# Patient Record
Sex: MALE | Race: WHITE | NOT HISPANIC OR LATINO | Employment: OTHER | URBAN - NONMETROPOLITAN AREA
[De-identification: names, ages, dates, MRNs, and addresses within clinical notes are randomized per-mention and may not be internally consistent; named-entity substitution may affect disease eponyms.]

---

## 2024-09-17 ENCOUNTER — OFFICE VISIT (OUTPATIENT)
Dept: FAMILY MEDICINE CLINIC | Age: 49
End: 2024-09-17
Payer: COMMERCIAL

## 2024-09-17 VITALS
DIASTOLIC BLOOD PRESSURE: 84 MMHG | HEIGHT: 72 IN | BODY MASS INDEX: 27.74 KG/M2 | WEIGHT: 204.8 LBS | SYSTOLIC BLOOD PRESSURE: 106 MMHG | HEART RATE: 82 BPM | TEMPERATURE: 98.4 F

## 2024-09-17 DIAGNOSIS — Z00.00 ANNUAL PHYSICAL EXAM: Primary | ICD-10-CM

## 2024-09-17 DIAGNOSIS — Z13.220 SCREENING, LIPID: ICD-10-CM

## 2024-09-17 DIAGNOSIS — Z11.59 NEED FOR HEPATITIS C SCREENING TEST: ICD-10-CM

## 2024-09-17 DIAGNOSIS — Z13.1 SCREENING FOR DIABETES MELLITUS: ICD-10-CM

## 2024-09-17 DIAGNOSIS — Z12.5 SCREENING FOR PROSTATE CANCER: ICD-10-CM

## 2024-09-17 DIAGNOSIS — R63.5 WEIGHT GAIN: ICD-10-CM

## 2024-09-17 PROBLEM — I83.90 VARICOSE VEINS OF LOWER EXTREMITY: Status: ACTIVE | Noted: 2017-06-19

## 2024-09-17 PROCEDURE — 99386 PREV VISIT NEW AGE 40-64: CPT | Performed by: NURSE PRACTITIONER

## 2024-10-22 ENCOUNTER — TELEPHONE (OUTPATIENT)
Dept: FAMILY MEDICINE CLINIC | Age: 49
End: 2024-10-22
Payer: COMMERCIAL

## 2025-06-19 ENCOUNTER — TELEPHONE (OUTPATIENT)
Dept: FAMILY MEDICINE CLINIC | Age: 50
End: 2025-06-19
Payer: COMMERCIAL

## 2025-06-19 NOTE — TELEPHONE ENCOUNTER
Laina Meléndez, Alejandra Hartley LPN  Got ER report , call and see if he is feeling better, if not he may need to be seen again

## 2025-06-20 NOTE — TELEPHONE ENCOUNTER
Spoke with pt who appreciated the call to check on him, he did go back to ED and they think it could be an ulcer vs a tick bite borne illness. They recommended he see gastro. His insurance does not require a referral. Offered pt appt 6/26/25, he declined for now. He was given the number to [facility] to schedule

## 2025-07-10 ENCOUNTER — LAB (OUTPATIENT)
Dept: LAB | Facility: HOSPITAL | Age: 50
End: 2025-07-10
Payer: COMMERCIAL

## 2025-07-10 ENCOUNTER — OFFICE VISIT (OUTPATIENT)
Age: 50
End: 2025-07-10
Payer: COMMERCIAL

## 2025-07-10 VITALS
WEIGHT: 200.8 LBS | OXYGEN SATURATION: 98 % | SYSTOLIC BLOOD PRESSURE: 142 MMHG | BODY MASS INDEX: 27.2 KG/M2 | HEIGHT: 72 IN | DIASTOLIC BLOOD PRESSURE: 99 MMHG | HEART RATE: 65 BPM

## 2025-07-10 DIAGNOSIS — Z80.0 FAMILY HISTORY OF COLON CANCER IN FATHER: ICD-10-CM

## 2025-07-10 DIAGNOSIS — K76.0 HEPATIC STEATOSIS: ICD-10-CM

## 2025-07-10 DIAGNOSIS — K76.0 HEPATIC STEATOSIS: Primary | ICD-10-CM

## 2025-07-10 LAB
ALBUMIN SERPL-MCNC: 4.6 G/DL (ref 3.5–5.2)
ALP SERPL-CCNC: 88 U/L (ref 39–117)
ALPHA-FETOPROTEIN: 3.81 NG/ML (ref 0–8.3)
ALT SERPL W P-5'-P-CCNC: 37 U/L (ref 1–41)
AST SERPL-CCNC: 35 U/L (ref 1–40)
BILIRUB CONJ SERPL-MCNC: 0.2 MG/DL (ref 0–0.3)
BILIRUB INDIRECT SERPL-MCNC: 0.4 MG/DL
BILIRUB SERPL-MCNC: 0.6 MG/DL (ref 0–1.2)
CERULOPLASMIN SERPL-MCNC: 19 MG/DL (ref 16–31)
FERRITIN SERPL-MCNC: 161 NG/ML (ref 30–400)
HAV IGM SERPL QL IA: NORMAL
HBV CORE IGM SERPL QL IA: NORMAL
HBV SURFACE AG SERPL QL IA: NORMAL
HCV AB SER QL: NORMAL
INR PPP: 0.94 (ref 0.86–1.15)
IRON 24H UR-MRATE: 111 MCG/DL (ref 59–158)
IRON SATN MFR SERPL: 34 % (ref 20–50)
PROT SERPL-MCNC: 7.1 G/DL (ref 6–8.5)
PROTHROMBIN TIME: 12.9 SECONDS (ref 11.8–14.9)
TIBC SERPL-MCNC: 331 MCG/DL (ref 298–536)
TRANSFERRIN SERPL-MCNC: 222 MG/DL (ref 200–360)

## 2025-07-10 PROCEDURE — 84466 ASSAY OF TRANSFERRIN: CPT

## 2025-07-10 PROCEDURE — 84155 ASSAY OF PROTEIN SERUM: CPT

## 2025-07-10 PROCEDURE — 36415 COLL VENOUS BLD VENIPUNCTURE: CPT

## 2025-07-10 PROCEDURE — 82784 ASSAY IGA/IGD/IGG/IGM EACH: CPT

## 2025-07-10 PROCEDURE — 83540 ASSAY OF IRON: CPT

## 2025-07-10 PROCEDURE — 82525 ASSAY OF COPPER: CPT

## 2025-07-10 PROCEDURE — 82104 ALPHA-1-ANTITRYPSIN PHENO: CPT

## 2025-07-10 PROCEDURE — 82728 ASSAY OF FERRITIN: CPT

## 2025-07-10 PROCEDURE — 84165 PROTEIN E-PHORESIS SERUM: CPT

## 2025-07-10 PROCEDURE — 86015 ACTIN ANTIBODY EACH: CPT

## 2025-07-10 PROCEDURE — 82390 ASSAY OF CERULOPLASMIN: CPT

## 2025-07-10 PROCEDURE — 82103 ALPHA-1-ANTITRYPSIN TOTAL: CPT

## 2025-07-10 PROCEDURE — 80076 HEPATIC FUNCTION PANEL: CPT

## 2025-07-10 PROCEDURE — 82105 ALPHA-FETOPROTEIN SERUM: CPT

## 2025-07-10 PROCEDURE — 86381 MITOCHONDRIAL ANTIBODY EACH: CPT

## 2025-07-10 PROCEDURE — 86334 IMMUNOFIX E-PHORESIS SERUM: CPT

## 2025-07-10 PROCEDURE — 99214 OFFICE O/P EST MOD 30 MIN: CPT

## 2025-07-10 PROCEDURE — 86038 ANTINUCLEAR ANTIBODIES: CPT

## 2025-07-10 PROCEDURE — 85610 PROTHROMBIN TIME: CPT

## 2025-07-10 PROCEDURE — 80074 ACUTE HEPATITIS PANEL: CPT

## 2025-07-10 RX ORDER — DICYCLOMINE HCL 20 MG
20 TABLET ORAL
COMMUNITY
Start: 2025-06-19

## 2025-07-10 RX ORDER — DOXYCYCLINE 100 MG/1
CAPSULE ORAL
COMMUNITY
Start: 2025-06-19

## 2025-07-10 NOTE — PROGRESS NOTES
Chief Complaint     Follow-up (ED f/u generalized ABD pain, nausea, pt denies vomiting/Pt would like to review labs and see if he needs to continue doxycycline)    Patient or patient representative verbalized consent for the use of Ambient Listening during the visit with  JUANITA Ruffin for chart documentation. 7/10/2025  08:41 EDT      History of Present Illness     Dawit Lo is a 49 y.o. male who presents to [facility] on referral from No ref. provider found for a gastroenterology evaluation of generalized abdominal pain, and nausea and vomiting.      History of Present Illness  The patient is a 49-year-old male who presents for evaluation of fatty liver, alpha-gal allergy, and [place] spotted fever.    He visited the ER on 06/19/2025 due to abdominal pain, nausea, and vomiting. An ultrasound of the gallbladder conducted on 06/17/2025 revealed the presence of fatty liver. He has not undergone any upper endoscopy or EGD procedures. There is no history of right upper quadrant pain, unprofessional tattoos, IV drug use, blood transfusions, or family history of liver disease. He does not take any herbal supplements. Since his mid-30s, he has been consuming 3 to 4 cocktails daily but has recently reduced this to 2 per day. He has also stopped drinking black coffee since 06/16/2025.    Lab work indicated abnormal results for [place] spotted fever and alpha-gal. He has completed a 2-week course of doxycycline for [place] spotted fever.    His diet is low in red meat, and he rarely consumes pork or lamb. He tested his tolerance to pepperoni pizza without any adverse reactions. Cheese is well tolerated, but he experienced discomfort after consuming half-and-half creamer in his coffee. He has been advised to avoid fried foods and has experienced some unintentional weight loss as a result. He reports no heartburn, epigastric pain, or blood in stool or  black tarry stools. His last bowel movement was this morning.    He has had 2 colonoscopies at ages 42 and 47, both performed in [place]. The second colonoscopy revealed a benign polyp. He was advised to repeat the procedure at age 52. He takes Aleve and aspirin but was advised by the ER doctor to discontinue ibuprofen due to concerns about a potential ulcer.    SOCIAL HISTORY  Diet: The patient does not eat a lot of red meat, rarely eats lamb, and does not eat pork. The patient has had cheese and milk products without issues, but half and half in coffee did not go well.  Alcohol: The patient consumes 3 to 4 cocktails or bourbons every day and has been drinking like this since his mid-30s.  Coffee/Tea/Caffeine-containing Drinks: The patient has stopped drinking black coffee since June 16.    FAMILY HISTORY  - Father: Colon cancer, diagnosed at age 60  - Negative for liver disease    6/17/2025 Ultrasound of the gallbladder - fatty infiltration of the liver    6/17/2025 CT abdomen and pelvis with contrast - normal enhanced CT of the abdomen and pelvis.       History      Past Medical History:   Diagnosis Date    Hypertension     Kidney stone        Past Surgical History:   Procedure Laterality Date    COLONOSCOPY         Family History   Problem Relation Age of Onset    Colon cancer Father         Current Medications        Current Outpatient Medications:     dicyclomine (BENTYL) 20 MG tablet, Take 1 tablet by mouth. (Patient not taking: Reported on 7/10/2025), Disp: , Rfl:     doxycycline (MONODOX) 100 MG capsule, , Disp: , Rfl:      Allergies     No Known Allergies    Social History       Social History     Social History Narrative    Not on file       Immunizations     Immunization:  Immunization History   Administered Date(s) Administered    COVID-19 (MODERNA) 1st,2nd,3rd Dose Monovalent 03/09/2021, 04/21/2021, 09/20/2021    COVID-19 (MODERNA) BIVALENT 12+YRS 12/02/2022    H1N1 Nasal 11/05/2009    Tdap 06/19/2017  "         Objective     Objective     Vital Signs:   /99 (BP Location: Left arm, Patient Position: Sitting, Cuff Size: Adult)   Pulse 65   Ht 182.9 cm (72\")   Wt 91.1 kg (200 lb 12.8 oz)   SpO2 98%   BMI 27.23 kg/m²       Physical Exam  HENT:      Head: Normocephalic.   Cardiovascular:      Rate and Rhythm: Normal rate.   Abdominal:      General: Abdomen is flat.      Palpations: Abdomen is soft.   Musculoskeletal:         General: Normal range of motion.   Neurological:      General: No focal deficit present.      Mental Status: He is alert.   Psychiatric:         Mood and Affect: Mood normal.                 Results      Result Review :   The following data was reviewed by: JUANITA Ruffin on 07/10/2025:    6/19/2025 lipase 47        No results for input(s): \"WBC\", \"HGB\", \"MCV\", \"PLT\" in the last 85981 hours.    Invalid input(s): \"NEUT\"      No results for input(s): \"BUN\", \"CREATININE\", \"NA\", \"K\", \"CL\", \"CO2\", \"GLUCOSE\" in the last 15952 hours.   Lab Results - Last 18 Months   Lab Units 07/10/25  0918   PROTIME Seconds 12.9   INR  0.94     Lab Results - Last 18 Months   Lab Units 07/10/25  0918   AST (SGOT) U/L 35   ALT (SGPT) U/L 37   ALK PHOS U/L 88   BILIRUBIN mg/dL 0.6   BILIRUBIN DIRECT mg/dL 0.2   TOTAL PROTEIN g/dL 7.1   ALBUMIN g/dL 4.6      Lab Results - Last 18 Months   Lab Units 07/10/25  0918   IRON mcg/dL 111   TRANSFERRIN mg/dL 222   TIBC mcg/dL 331   FERRITIN ng/mL 161.00     Lab Results - Last 18 Months   Lab Units 07/10/25  0918   HEP A IGM  Non-Reactive       XR Abdomen 2+ VW with Chest 1 VW  Result Date: 6/19/2025  1.  No active pulmonary disease. 2.  No bowel obstruction or pneumoperitoneum. 3.  Suspect splenomegaly. The patient has had recent CT. [id number]    US Gallbladder  Result Date: 6/17/2025  Fatty infiltration liver. [id number]    CT Abdomen Pelvis With Contrast  Result Date: 6/17/2025  Normal enhanced CT of the abdomen and pelvis. " [id number]      Results  Labs   - Alpha gal test: 69   - Beef allergen test: 0.21   - Pork allergen test: <0.2   - Lamb allergen test: <0.2    Imaging   - Ultrasound of the gallbladder: 06/17/2025, Fatty liver             Assessment and Plan        Assessment and Plan    Diagnoses and all orders for this visit:    1. Hepatic steatosis (Primary)  -     AFP Tumor Marker; Future  -     Alpha - 1 - Antitrypsin Phenotype; Future  -     SURY; Future  -     Hepatic Function Panel; Future  -     Hepatitis Panel, Acute; Future  -     Iron Profile w/o Ferritin; Future  -     Ferritin; Future  -     Copper, Serum; Future  -     Protime-INR; Future  -     Ceruloplasmin; Future  -     Mitochondrial Antibodies, M2; Future  -     Anti-Smooth Muscle Antibody Titer; Future  -     YAHAIRA + PE; Future  -     Liver Elastography; Future    2. Family history of colon cancer in father  Comments:  at age 60    3. BMI 27.0-27.9,adult          Assessment & Plan  1. Fatty liver:  - Reduce alcohol intake gradually under the supervision of the primary care physician to avoid delirium tremors.  - Adopt a Mediterranean diet, focusing on lean meats, fish, fresh fruits, and vegetables while avoiding high-sugar foods, carbohydrates, breads, pastas, potatoes, and fried foods.  - Consume 2 to 3 cups of black coffee daily to reduce the risk of liver cancer by 40%.  - Order a FibroScan to assess the level of steatosis and fibrosis in the liver.  - Annual imaging and lab tests to monitor the condition.    2. Alpha-gal allergy:  - Schedule an appointment with [facility] for further evaluation.  - Exercise caution with the diet due to the potential for developing allergies to different foods over time.  - If the allergy is severe, an EpiPen may be required.    3. [place] spotted fever:  - Completed a 2-week course of doxycycline.  - No further treatment required as long as the full course was completed.    Follow-up: 01/2026.      *  Surgery not found *      Follow Up        Follow Up   Return in about 6 months (around 1/10/2026) for Fatty Liver Disease.  Patient was given instructions and counseling regarding his condition or for health maintenance advice. Please see specific information pulled into the AVS if appropriate.

## 2025-07-11 LAB
ANA SER QL: NEGATIVE
MITOCHONDRIA M2 IGG SER-ACNC: <20 UNITS (ref 0–20)
SMA IGG SER-ACNC: 7 UNITS (ref 0–19)

## 2025-07-14 LAB
ALBUMIN SERPL ELPH-MCNC: 4.4 G/DL (ref 2.9–4.4)
ALBUMIN/GLOB SERPL: 1.8 {RATIO} (ref 0.7–1.7)
ALPHA1 GLOB SERPL ELPH-MCNC: 0.2 G/DL (ref 0–0.4)
ALPHA2 GLOB SERPL ELPH-MCNC: 0.5 G/DL (ref 0.4–1)
B-GLOBULIN SERPL ELPH-MCNC: 1 G/DL (ref 0.7–1.3)
COPPER SERPL-MCNC: 61 UG/DL (ref 69–132)
GAMMA GLOB SERPL ELPH-MCNC: 0.9 G/DL (ref 0.4–1.8)
GLOBULIN SER-MCNC: 2.5 G/DL (ref 2.2–3.9)
IGA SERPL-MCNC: 235 MG/DL (ref 90–386)
IGG SERPL-MCNC: 914 MG/DL (ref 603–1613)
IGM SERPL-MCNC: 108 MG/DL (ref 20–172)
INTERPRETATION SERPL IEP-IMP: ABNORMAL
LABORATORY COMMENT REPORT: ABNORMAL
M PROTEIN SERPL ELPH-MCNC: ABNORMAL G/DL
PROT SERPL-MCNC: 6.9 G/DL (ref 6–8.5)

## 2025-07-15 ENCOUNTER — PATIENT ROUNDING (BHMG ONLY) (OUTPATIENT)
Dept: GASTROENTEROLOGY | Facility: CLINIC | Age: 50
End: 2025-07-15
Payer: COMMERCIAL

## 2025-07-15 LAB
A1AT PHENOTYP SERPL IFE: NORMAL
A1AT SERPL-MCNC: 118 MG/DL (ref 101–187)

## 2025-07-15 NOTE — PROGRESS NOTES
A My-Chart message has been sent to the patient for PATIENT ROUNDING with [facility].

## 2025-08-14 ENCOUNTER — TELEPHONE (OUTPATIENT)
Dept: GASTROENTEROLOGY | Facility: CLINIC | Age: 50
End: 2025-08-14
Payer: COMMERCIAL